# Patient Record
Sex: FEMALE | Race: WHITE
[De-identification: names, ages, dates, MRNs, and addresses within clinical notes are randomized per-mention and may not be internally consistent; named-entity substitution may affect disease eponyms.]

---

## 2018-10-19 ENCOUNTER — HOSPITAL ENCOUNTER (EMERGENCY)
Dept: HOSPITAL 74 - FER | Age: 53
LOS: 1 days | Discharge: HOME | End: 2018-10-20
Payer: COMMERCIAL

## 2018-10-19 VITALS — SYSTOLIC BLOOD PRESSURE: 124 MMHG | HEART RATE: 100 BPM | DIASTOLIC BLOOD PRESSURE: 93 MMHG | TEMPERATURE: 99.3 F

## 2018-10-19 VITALS — BODY MASS INDEX: 25.8 KG/M2

## 2018-10-19 DIAGNOSIS — K52.9: Primary | ICD-10-CM

## 2018-10-19 PROCEDURE — 3E033GC INTRODUCTION OF OTHER THERAPEUTIC SUBSTANCE INTO PERIPHERAL VEIN, PERCUTANEOUS APPROACH: ICD-10-PCS

## 2018-10-19 PROCEDURE — 3E0337Z INTRODUCTION OF ELECTROLYTIC AND WATER BALANCE SUBSTANCE INTO PERIPHERAL VEIN, PERCUTANEOUS APPROACH: ICD-10-PCS

## 2018-10-19 PROCEDURE — 3E0333Z INTRODUCTION OF ANTI-INFLAMMATORY INTO PERIPHERAL VEIN, PERCUTANEOUS APPROACH: ICD-10-PCS

## 2018-10-19 NOTE — PDOC
History of Present Illness





- General


Chief Complaint: Nausea


Stated Complaint: MALAISE


Time Seen by Provider: 10/19/18 23:07





- History of Present Illness


Initial Comments: 





10/19/18 23:35


This 52-year-old woman without significant past medical history presents with 

several hour history of nausea/vomiting/malaise.  Patient was traveling back 

from [place] by car (had been visiting [facility] the last day) when 

symptoms began.  Patient vomited 3 times (partially digested food without blood 

or coffee grounds) over the last 6 hours.  Prior to the nausea/vomiting, 

patient had onset of muscle aches and malaise. No known sick contacts.  No 

diarrhea,fever/chills.  Patient returned 1 week ago from two-week cruise.  

Patient's health was good during the cruise.


Patient is a smoker (few cigarettes per day); no history of substance abuse





On no medications


No known ALLERGIES

















Past History





- Past Medical History


Allergies/Adverse Reactions: 


 Allergies











Allergy/AdvReac Type Severity Reaction Status Date / Time


 


No Known Allergies Allergy   Unverified 10/19/18 23:08











Home Medications: 


Ambulatory Orders





Ondansetron [Zofran Odt -] 4 mg SL TID PRN #10 od.tablet 10/20/18 








COPD: No


Other medical history: DENIES





- Suicide/Smoking/Psychosocial Hx


Smoking History: Never smoked


Number of Cigarettes Smoked Daily: 4


Information on smoking cessation initiated: Yes





**Review of Systems





- Review of Systems


Able to Perform ROS?: Yes


Comments:: 





12 point review of systems is negative except for what is noted in the history 

of present illness








*Physical Exam





- Vital Signs


 Last Vital Signs











Temp Pulse Resp BP Pulse Ox


 


 99.3 F   100 H  16   124/93   100 


 


 10/19/18 23:08  10/19/18 23:08  10/19/18 23:08  10/19/18 23:08  10/19/18 23:08














- Physical Exam


Comments: 





GENERAL: Adult female, alert and oriented 3, no acute distress; oral 

temperature 99.3F;pulse 100/min


HEAD: Normal with no signs of trauma.


EYES: PERRLA, EOMI, sclera anicteric, conjunctiva clear.


ENT: Ears normal, nares patent, oropharynx clear without exudates.  Dry mucous 

membranes.


NECK: Normal range of motion, supple without lymphadenopathy, JVD, or masses.


LUNGS: Breath sounds equal, clear to auscultation bilaterally.  No wheezes, and 

no crackles.


HEART:Regular rate and rhythm, normal S1 and S2 without murmur, rub or gallop.


ABDOMEN:.normal bowel sounds  No guarding,tenderness or rebound.No masses No 

distention. 


EXTREMITIES: Normal range of motion, no edema.  No clubbing or cyanosis. No 

erythema, or tenderness.


NEUROLOGICAL: Cranial nerves II through XII grossly intact.  Normal speech.  No 

focal 


neurological deficits. 


MUSCULOSKELETAL:  Back non-tender to palpation, no CVA tenderness


SKIN: Warm, Dry, normal turgor, no rashes or lesions noted.














ED Treatment Course





- LABORATORY


CBC & Chemistry Diagram: 


 10/19/18 23:40





 10/19/18 23:40





Medical Decision Making





- Medical Decision Making





As noted above, this 52-year-old woman presents with several hour history of 

nausea/vomiting/malaise.  No diarrhea has occurred.  She has returned from a 

cruise about 1 week ago; she was well during the vacation.  No clear history of 

ingestion of possibly contaminated food.  Exam as noted with patient appearing 

clinically dehydrated.





Patient received 1 L normal saline IV hydration; she also received Zofran 4 mg 

IV and Toradol 30 mg IV for her myalgias.





Chemistry profile and CBC evaluated.  Lab work is essentially normal except for 

evidence of moderate prerenal azotemia (BUN 19/creatinine 0.9).





After IV hydration and medications, patient feels significantly better.  She is 

able to tolerate water by mouth without nausea or vomiting.  The patient will 

be discharged with instructions to maintain clear liquid diet and advance to 

solid foods very cautiously.  She is scheduled to work over the next 2 days and 

will be given work documentation to stay home.  She should return to the ER if 

she has recurrent vomiting or develops high fever/abdominal pain.  She should 

follow-up with her own doctor within the next 5 days








*DC/Admit/Observation/Transfer


Diagnosis at time of Disposition: 


 Gastroenteritis








- Discharge Dispostion


Disposition: HOME


Condition at time of disposition: Stable





- Prescriptions


Prescriptions: 


Ondansetron [Zofran Odt -] 4 mg SL TID PRN #10 od.tablet


 PRN Reason: Nausea





- Referrals





- Patient Instructions


Printed Discharge Instructions:  [facility] for Viral Gastroenteritis -- Adult


Additional Instructions: 


clear liquids, advance diet slowly


Zofran ODT 4mg up to 3 times a day for nausea


no work until Monday, 10/22


return to ER if you have vomiting/increased abdominal pain/fever


follwup with your doctor within 5 days





- Post Discharge Activity


Forms/Work/School Notes:  Back to Work

## 2018-10-20 LAB
ALBUMIN SERPL-MCNC: 4.3 G/DL (ref 3.4–5)
ALP SERPL-CCNC: 84 U/L (ref 45–117)
ALT SERPL-CCNC: 45 U/L (ref 13–61)
ANION GAP SERPL CALC-SCNC: 11 MMOL/L (ref 8–16)
AST SERPL-CCNC: 36 U/L (ref 15–37)
BASOPHILS # BLD: 0.1 % (ref 0–2)
BILIRUB SERPL-MCNC: 0.6 MG/DL (ref 0.2–1)
BUN SERPL-MCNC: 19 MG/DL (ref 7–18)
CALCIUM SERPL-MCNC: 9.2 MG/DL (ref 8.5–10.1)
CHLORIDE SERPL-SCNC: 105 MMOL/L (ref 98–107)
CO2 SERPL-SCNC: 24 MMOL/L (ref 21–32)
CREAT SERPL-MCNC: 0.9 MG/DL (ref 0.55–1.3)
DEPRECATED RDW RBC AUTO: 13.7 % (ref 11.6–15.6)
EOSINOPHIL # BLD: 0.2 % (ref 0–4.5)
GLUCOSE SERPL-MCNC: 116 MG/DL (ref 74–106)
HCT VFR BLD CALC: 39.8 % (ref 32.4–45.2)
HGB BLD-MCNC: 13.7 GM/DL (ref 10.7–15.3)
LIPASE SERPL-CCNC: 283 U/L (ref 73–393)
LYMPHOCYTES # BLD: 7.3 % (ref 8–40)
MCH RBC QN AUTO: 33.6 PG (ref 25.7–33.7)
MCHC RBC AUTO-ENTMCNC: 34.6 G/DL (ref 32–36)
MCV RBC: 97.2 FL (ref 80–96)
MONOCYTES # BLD AUTO: 3.1 % (ref 3.8–10.2)
NEUTROPHILS # BLD: 89.3 % (ref 42.8–82.8)
PLATELET # BLD AUTO: 230 K/MM3 (ref 134–434)
PMV BLD: 9.6 FL (ref 7.5–11.1)
POTASSIUM SERPLBLD-SCNC: 4.1 MMOL/L (ref 3.5–5.1)
PROT SERPL-MCNC: 7.9 G/DL (ref 6.4–8.2)
RBC # BLD AUTO: 4.09 M/MM3 (ref 3.6–5.2)
SODIUM SERPL-SCNC: 140 MMOL/L (ref 136–145)
WBC # BLD AUTO: 9.7 K/MM3 (ref 4–10)